# Patient Record
(demographics unavailable — no encounter records)

---

## 2024-10-08 NOTE — PAST MEDICAL HISTORY
[Increasing age ( >40 years old)] : Increasing age ( >40 years old) [Altered mobility] : Altered mobility [Congestive Heart Failure] : Congestive Heart Failure [Varicose Veins] : Varicose Veins [No therapy indicated for cases scheduled for less than one hour] : No therapy indicated for cases scheduled for less than one hour. [FreeTextEntry1] : Malignant Hyperthermia Screening Tool and Risk of Bleeding Assessment  Ms. ADDIE RODRIGUEZ denies family history of unexpected death following Anesthesia or Exercise. Denies Family history of Malignant Hyperthermia, Muscle or Neuromuscular disorder and High Temperature following exercise.  Ms. ADDIE RODRIGUEZ denies history of Muscle Spasm, Dark or Chocolate - Colored urine and Unanticipated fever immediately following anesthesia or serious exercise.  Ms. RODRIGUEZ also denies bleeding tendencies/ Risks of Bleeding.

## 2024-10-08 NOTE — ASSESSMENT
[FreeTextEntry1] : 70-year-old female with bilateral lower extremity venous stasis ulcers, status post right saphenous vein ablation  dea > 1 b/l lower extremities with normal distal waveforms  Left lateral ankle wound continues to improve.  Based on the fact that patient has severe venous stasis changes with ulcers and superficial venous insufficiency, plan for ablation of the left greater and lesser saphenous vein.

## 2024-10-08 NOTE — HISTORY OF PRESENT ILLNESS
[FreeTextEntry1] : accompanied by daughter Lo [phone] and darcie Jordy [phone] feels ok took methadone, BP meds and aspirin this morning  [FreeTextEntry5] : yesterday at  [FreeTextEntry6] : Dr. Manzanares

## 2024-10-21 NOTE — HISTORY OF PRESENT ILLNESS
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  Patient is currently being treated with bilateral lower extremity Unna boots.  Denies rest pain or claudication symptoms.  Denies fever or chills.

## 2024-10-21 NOTE — ASSESSMENT
[FreeTextEntry1] : 70-year-old female with bilateral lower extremity venous stasis ulcers, status post right saphenous vein ablation  dea > 1 b/l lower extremities with normal distal waveforms  Left lateral ankle wound continues to improve. Patient with recent hospitalization and high fevers requiring antibiotics and local wound care.  Will hold off ablation at this time.  Continue local wound care with application of right lower extremity Unna boot.   Follow-up next week for change of Unna boot.

## 2024-10-21 NOTE — PHYSICAL EXAM
[JVD] : no jugular venous distention  [Normal Breath Sounds] : Normal breath sounds [Normal Rate and Rhythm] : normal rate and rhythm [0] : left 0 [2+] : left 2+ [Ankle Swelling (On Exam)] : not present [Varicose Veins Of Lower Extremities] : not present [] : bilaterally [Ankle Swelling On The Left] : moderate [Skin Ulcer] : ulcer [Alert] : alert [Calm] : calm [de-identified] : appears well  [de-identified] : Right medial ankle ulcer - 1.8 x 3 cm

## 2024-10-30 NOTE — PHYSICAL EXAM
[Normal Breath Sounds] : Normal breath sounds [Normal Rate and Rhythm] : normal rate and rhythm [0] : left 0 [2+] : left 2+ [] : bilaterally [Ankle Swelling On The Left] : moderate [Skin Ulcer] : ulcer [Alert] : alert [Calm] : calm [JVD] : no jugular venous distention  [Ankle Swelling (On Exam)] : not present [Varicose Veins Of Lower Extremities] : not present [de-identified] : appears well  [de-identified] : Right medial ankle ulcer - 1.2 x 1.5 cm

## 2024-10-30 NOTE — PHYSICAL EXAM
[Normal Breath Sounds] : Normal breath sounds [Normal Rate and Rhythm] : normal rate and rhythm [0] : left 0 [2+] : left 2+ [] : bilaterally [Ankle Swelling On The Left] : moderate [Skin Ulcer] : ulcer [Alert] : alert [Calm] : calm [JVD] : no jugular venous distention  [Ankle Swelling (On Exam)] : not present [Varicose Veins Of Lower Extremities] : not present [de-identified] : appears well  [de-identified] : Right medial ankle ulcer - 1.2 x 1.5 cm

## 2024-10-30 NOTE — ASSESSMENT
[Arterial/Venous Disease] : arterial/venous disease [Ulcer Care] : ulcer care [FreeTextEntry1] : 70-year-old female with bilateral lower extremity venous stasis ulcers, status post right saphenous vein ablation  dea > 1 b/l lower extremities with normal distal waveforms  Right medial ankle wound continues to improve. Patient with recent hospitalization and high fevers requiring antibiotics and local wound care.  Will hold off ablation at this time.  Continue local wound care with application of bilateral lower extremity Unna boots.   Follow-up next week for change of Unna boot.

## 2024-11-05 NOTE — ASSESSMENT
[FreeTextEntry1] : 70-year-old female with bilateral lower extremity venous stasis ulcers, status post right saphenous vein ablation  dea > 1 b/l lower extremities with normal distal waveforms  Right medial ankle wound continues to improve. Patient with recent hospitalization and high fevers requiring antibiotics and local wound care.  Will hold off ablation at this time.  Continue local wound care with application of bilateral lower extremity Unna boots.   Follow-up next week for change of Unna boot. [Arterial/Venous Disease] : arterial/venous disease [Ulcer Care] : ulcer care

## 2024-11-05 NOTE — PHYSICAL EXAM
[JVD] : no jugular venous distention  [Normal Breath Sounds] : Normal breath sounds [Normal Rate and Rhythm] : normal rate and rhythm [0] : left 0 [2+] : left 2+ [Ankle Swelling (On Exam)] : not present [Varicose Veins Of Lower Extremities] : not present [] : bilaterally [Ankle Swelling On The Left] : moderate [Skin Ulcer] : ulcer [Alert] : alert [Calm] : calm [de-identified] : appears well  [de-identified] : Right medial ankle ulcer - 1.2 x 1.5 cm

## 2024-11-12 NOTE — PHYSICAL EXAM
[Normal Breath Sounds] : Normal breath sounds [Normal Rate and Rhythm] : normal rate and rhythm [0] : left 0 [2+] : left 2+ [Ankle Swelling On The Left] : moderate [] : bilaterally [Skin Ulcer] : ulcer [Alert] : alert [Calm] : calm [JVD] : no jugular venous distention  [Ankle Swelling (On Exam)] : not present [Varicose Veins Of Lower Extremities] : not present [de-identified] : appears well  [de-identified] : Right medial ankle ulcer - 1.2 x 1.5 cm

## 2024-11-18 NOTE — PHYSICAL EXAM
[JVD] : no jugular venous distention  [Normal Breath Sounds] : Normal breath sounds [Normal Rate and Rhythm] : normal rate and rhythm [0] : left 0 [2+] : left 2+ [Ankle Swelling (On Exam)] : not present [Varicose Veins Of Lower Extremities] : not present [] : bilaterally [Ankle Swelling On The Left] : moderate [Skin Ulcer] : ulcer [Alert] : alert [Calm] : calm [de-identified] : appears well  [de-identified] : Right medial ankle ulcer - 1.2 x 1.5 cm

## 2024-11-25 NOTE — PHYSICAL EXAM
[JVD] : no jugular venous distention  [Normal Breath Sounds] : Normal breath sounds [Normal Rate and Rhythm] : normal rate and rhythm [0] : left 0 [2+] : left 2+ [Ankle Swelling (On Exam)] : not present [Varicose Veins Of Lower Extremities] : not present [] : bilaterally [Ankle Swelling On The Left] : moderate [Skin Ulcer] : ulcer [Alert] : alert [Calm] : calm [de-identified] : appears well  [de-identified] : Right medial ankle ulcer - 1.2 x 1.5 cm

## 2024-12-02 NOTE — PHYSICAL EXAM
[JVD] : no jugular venous distention  [Normal Breath Sounds] : Normal breath sounds [Normal Rate and Rhythm] : normal rate and rhythm [0] : left 0 [2+] : left 2+ [Ankle Swelling (On Exam)] : not present [Varicose Veins Of Lower Extremities] : not present [] : bilaterally [Ankle Swelling On The Left] : moderate [Skin Ulcer] : ulcer [Alert] : alert [Calm] : calm [de-identified] : appears well  [de-identified] : Right medial ankle ulcer - 1.2 x 1.5 cm

## 2024-12-09 NOTE — PHYSICAL EXAM
[JVD] : no jugular venous distention  [Normal Breath Sounds] : Normal breath sounds [Normal Rate and Rhythm] : normal rate and rhythm [0] : left 0 [2+] : left 2+ [Ankle Swelling (On Exam)] : not present [Varicose Veins Of Lower Extremities] : not present [] : bilaterally [Ankle Swelling On The Left] : moderate [Skin Ulcer] : ulcer [Alert] : alert [Calm] : calm [de-identified] : appears well  [de-identified] : Right medial ankle ulcer - 1.2 x 1.5 cm

## 2024-12-16 NOTE — PHYSICAL EXAM
[Normal Breath Sounds] : Normal breath sounds [Normal Rate and Rhythm] : normal rate and rhythm [0] : left 0 [2+] : left 2+ [] : bilaterally [Ankle Swelling On The Left] : moderate [Skin Ulcer] : ulcer [Alert] : alert [Calm] : calm [JVD] : no jugular venous distention  [Ankle Swelling (On Exam)] : not present [Varicose Veins Of Lower Extremities] : not present [de-identified] : appears well  [de-identified] : Right medial ankle ulcer - 1.2 x 1.5 cm

## 2024-12-23 NOTE — PHYSICAL EXAM
[JVD] : no jugular venous distention  [Normal Breath Sounds] : Normal breath sounds [Normal Rate and Rhythm] : normal rate and rhythm [0] : left 0 [2+] : left 2+ [Ankle Swelling (On Exam)] : not present [Varicose Veins Of Lower Extremities] : not present [] : bilaterally [Ankle Swelling On The Left] : moderate [Skin Ulcer] : ulcer [Alert] : alert [Calm] : calm [de-identified] : appears well  [de-identified] : Right medial ankle ulcer - 1.2 x 1.5 cm

## 2024-12-30 NOTE — PHYSICAL EXAM
[Normal Breath Sounds] : Normal breath sounds [Normal Rate and Rhythm] : normal rate and rhythm [0] : left 0 [2+] : left 2+ [] : bilaterally [Ankle Swelling On The Left] : moderate [Skin Ulcer] : ulcer [Alert] : alert [Calm] : calm [JVD] : no jugular venous distention  [Ankle Swelling (On Exam)] : not present [de-identified] : appears well  [Varicose Veins Of Lower Extremities] : not present [de-identified] : Right medial ankle ulcer - 1 x 1.4 cm

## 2025-01-06 NOTE — PHYSICAL EXAM
[JVD] : no jugular venous distention  [Normal Breath Sounds] : Normal breath sounds [Normal Rate and Rhythm] : normal rate and rhythm [0] : left 0 [2+] : left 2+ [Ankle Swelling (On Exam)] : not present [Varicose Veins Of Lower Extremities] : not present [] : bilaterally [Ankle Swelling On The Left] : moderate [Skin Ulcer] : ulcer [Alert] : alert [Calm] : calm [de-identified] : appears well  [de-identified] : Right medial ankle ulcer - 1 x 1.4 cm

## 2025-01-13 NOTE — PHYSICAL EXAM
[Normal Breath Sounds] : Normal breath sounds [Normal Rate and Rhythm] : normal rate and rhythm [0] : left 0 [2+] : left 2+ [] : bilaterally [Ankle Swelling On The Left] : moderate [Skin Ulcer] : ulcer [Alert] : alert [Calm] : calm [JVD] : no jugular venous distention  [Ankle Swelling (On Exam)] : not present [Varicose Veins Of Lower Extremities] : not present [de-identified] : appears well  [de-identified] : Right medial ankle ulcer - 1.1 x 0.7cm

## 2025-01-28 NOTE — PHYSICAL EXAM
[Normal Breath Sounds] : Normal breath sounds [Normal Rate and Rhythm] : normal rate and rhythm [0] : left 0 [2+] : left 2+ [] : bilaterally [Ankle Swelling On The Left] : moderate [Skin Ulcer] : ulcer [Alert] : alert [Calm] : calm [JVD] : no jugular venous distention  [Ankle Swelling (On Exam)] : not present [Varicose Veins Of Lower Extremities] : not present [de-identified] : appears well  [de-identified] : Right medial ankle ulcer - 1.1 x 0.5 cm

## 2025-01-28 NOTE — PHYSICAL EXAM
[Normal Breath Sounds] : Normal breath sounds [Normal Rate and Rhythm] : normal rate and rhythm [0] : left 0 [2+] : left 2+ [] : bilaterally [Ankle Swelling On The Left] : moderate [Skin Ulcer] : ulcer [Alert] : alert [Calm] : calm [JVD] : no jugular venous distention  [Ankle Swelling (On Exam)] : not present [Varicose Veins Of Lower Extremities] : not present [de-identified] : appears well  [de-identified] : Right medial ankle ulcer - 1.1 x 0.5 cm

## 2025-02-03 NOTE — PHYSICAL EXAM
[Normal Breath Sounds] : Normal breath sounds [Normal Rate and Rhythm] : normal rate and rhythm [0] : left 0 [2+] : left 2+ [] : bilaterally [Ankle Swelling On The Left] : moderate [Skin Ulcer] : ulcer [Alert] : alert [Calm] : calm

## 2025-02-10 NOTE — ASSESSMENT
[FreeTextEntry1] : 70-year-old female with bilateral lower extremity venous stasis ulcers, status post right saphenous vein ablation  dea > 1 b/l lower extremities with normal distal waveforms  Right medial ankle wound continues to improve.  Continue local wound care with application of bilateral lower extremity Unna boots.   Follow-up next week for change of Unna boot. [Arterial/Venous Disease] : arterial/venous disease [Ulcer Care] : ulcer care

## 2025-02-10 NOTE — PHYSICAL EXAM
[JVD] : no jugular venous distention  [Normal Breath Sounds] : Normal breath sounds [Normal Rate and Rhythm] : normal rate and rhythm [0] : left 0 [2+] : left 2+ [Ankle Swelling (On Exam)] : not present [Varicose Veins Of Lower Extremities] : not present [] : bilaterally [Ankle Swelling On The Left] : moderate [Skin Ulcer] : ulcer [Alert] : alert [Calm] : calm [de-identified] : appears well  [de-identified] : Right medial ankle ulcer - 0.5 x 0.7 cm

## 2025-02-18 NOTE — ASSESSMENT
[Arterial/Venous Disease] : arterial/venous disease [Ulcer Care] : ulcer care [FreeTextEntry1] : 70-year-old female with bilateral lower extremity venous stasis ulcers, status post right saphenous vein ablation  dea > 1 b/l lower extremities with normal distal waveforms  Right medial ankle wound continues to improve.  Continue local wound care with application of bilateral lower extremity Unna boots.   Follow-up next week for change of Unna boot.

## 2025-02-18 NOTE — PHYSICAL EXAM
[Normal Breath Sounds] : Normal breath sounds [Normal Rate and Rhythm] : normal rate and rhythm [0] : left 0 [2+] : left 2+ [] : bilaterally [Ankle Swelling On The Left] : moderate [Skin Ulcer] : ulcer [Alert] : alert [Calm] : calm [JVD] : no jugular venous distention  [Ankle Swelling (On Exam)] : not present [Varicose Veins Of Lower Extremities] : not present [de-identified] : appears well  [de-identified] : Right medial ankle ulcer - 0.4 x 0.5 cm

## 2025-02-24 NOTE — PHYSICAL EXAM
[Normal Breath Sounds] : Normal breath sounds [Normal Rate and Rhythm] : normal rate and rhythm [0] : left 0 [2+] : left 2+ [] : bilaterally [Ankle Swelling On The Left] : moderate [Skin Ulcer] : ulcer [Alert] : alert [Calm] : calm [JVD] : no jugular venous distention  [Ankle Swelling (On Exam)] : not present [Varicose Veins Of Lower Extremities] : not present [de-identified] : appears well  [de-identified] : Right medial ankle ulcer - 0.3 x 0.4 cm

## 2025-03-11 NOTE — PHYSICAL EXAM
[Normal Breath Sounds] : Normal breath sounds [Normal Rate and Rhythm] : normal rate and rhythm [0] : left 0 [2+] : left 2+ [Ankle Swelling On The Right] : of the right ankle [] : bilaterally [Ankle Swelling On The Left] : moderate [Skin Ulcer] : ulcer [Alert] : alert [Calm] : calm [JVD] : no jugular venous distention  [Ankle Swelling (On Exam)] : not present [Varicose Veins Of Lower Extremities] : not present [de-identified] : appears well  [FreeTextEntry1] : Moderate to severe right calf edema [de-identified] : Right medial ankle ulcer - 0.6 x 0.8 cm, Superficial skin loss of right foot

## 2025-03-11 NOTE — HISTORY OF PRESENT ILLNESS
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  Patient is currently being treated with bilateral lower extremity Unna boots. Patient reports recent hospital admission for bacterial infection.  Denies rest pain or claudication symptoms.  Denies fever or chills.

## 2025-03-11 NOTE — ASSESSMENT
[Arterial/Venous Disease] : arterial/venous disease [Ulcer Care] : ulcer care [FreeTextEntry1] : 70-year-old female with bilateral lower extremity venous stasis ulcers, status post right saphenous vein ablation  Recommend Augmentin 875mg x 10 days.  Continue local wound care with application of bilateral lower extremity Unna boots.   Follow-up Thursday for change of Unna boot.

## 2025-03-11 NOTE — PHYSICAL EXAM
[Normal Breath Sounds] : Normal breath sounds [Normal Rate and Rhythm] : normal rate and rhythm [0] : left 0 [2+] : left 2+ [Ankle Swelling On The Right] : of the right ankle [] : bilaterally [Ankle Swelling On The Left] : moderate [Skin Ulcer] : ulcer [Alert] : alert [Calm] : calm [JVD] : no jugular venous distention  [Ankle Swelling (On Exam)] : not present [Varicose Veins Of Lower Extremities] : not present [de-identified] : appears well  [FreeTextEntry1] : Moderate to severe right calf edema [de-identified] : Right medial ankle ulcer - 0.6 x 0.8 cm, Superficial skin loss of right foot

## 2025-03-17 NOTE — ASSESSMENT
[FreeTextEntry1] : 70-year-old female with bilateral lower extremity venous stasis ulcers, status post right saphenous vein ablation  Recommend Augmentin 875mg x 10 days.  Continue local wound care with application of bilateral lower extremity Unna boots.   Follow-up Thursday for change of Unna boot. [Arterial/Venous Disease] : arterial/venous disease [Ulcer Care] : ulcer care

## 2025-03-17 NOTE — PHYSICAL EXAM
[JVD] : no jugular venous distention  [Normal Breath Sounds] : Normal breath sounds [Normal Rate and Rhythm] : normal rate and rhythm [0] : left 0 [2+] : left 2+ [Ankle Swelling (On Exam)] : not present [Ankle Swelling On The Right] : of the right ankle [Varicose Veins Of Lower Extremities] : not present [] : bilaterally [Ankle Swelling On The Left] : moderate [Skin Ulcer] : ulcer [Alert] : alert [Calm] : calm [de-identified] : appears well  [FreeTextEntry1] : Moderate to severe right calf edema [de-identified] : Right medial ankle ulcer - 0.6 x 0.8 cm, Superficial skin loss of right foot

## 2025-03-18 NOTE — PROCEDURE
[FreeTextEntry1] : Left leg endovenous laser ablation  [D/C IV on discharge] : D/C IV on discharge [Resume diet] : resume diet [Dressing checked for bleeding] : Dressing checked for bleeding [Vital signs on admission the q 15 mins x2] : Vital signs on admission the q 15 mins x2

## 2025-03-18 NOTE — ASSESSMENT
[FreeTextEntry1] : 70-year-old female with bilateral lower extremity venous stasis ulcers, status post right saphenous vein ablation  Plan for left leg endovenous laser ablation

## 2025-03-18 NOTE — HISTORY OF PRESENT ILLNESS
[FreeTextEntry1] : accompanied by daughter Lo [phone] and aide Jordy [phone] feels ok took methadone, BP meds and aspirin this morning bilateral unna boots on [FreeTextEntry6] : Dr. Manzanares [FreeTextEntry5] : yesterday at

## 2025-03-24 NOTE — PHYSICAL EXAM
[JVD] : no jugular venous distention  [Normal Breath Sounds] : Normal breath sounds [Normal Rate and Rhythm] : normal rate and rhythm [0] : left 0 [2+] : left 2+ [Ankle Swelling (On Exam)] : not present [Ankle Swelling On The Right] : of the right ankle [Varicose Veins Of Lower Extremities] : not present [] : bilaterally [Ankle Swelling On The Left] : moderate [Skin Ulcer] : ulcer [Alert] : alert [Calm] : calm [de-identified] : appears well  [FreeTextEntry1] : Moderate to severe right calf edema [de-identified] : Right medial ankle ulcer - 0.6 x 0.8 cm, Superficial skin loss of right foot

## 2025-03-24 NOTE — ASSESSMENT
[FreeTextEntry1] : 70-year-old female with bilateral lower extremity venous stasis ulcers, status post right saphenous vein ablation  Continue local wound care with application of bilateral lower extremity Unna boots.  Patient with severe venous insufficiency involving the left lower extremity.  Will schedule the patient for ablation of saphenous vein in the left leg this week.   [Arterial/Venous Disease] : arterial/venous disease [Ulcer Care] : ulcer care

## 2025-03-31 NOTE — ASSESSMENT
[Arterial/Venous Disease] : arterial/venous disease [Ulcer Care] : ulcer care [FreeTextEntry1] : 70-year-old female with bilateral lower extremity venous stasis ulcers, status post right saphenous vein ablation  Continue local wound care with application of bilateral lower extremity Unna boots.  Patient with severe venous insufficiency involving the left lower extremity.  Will schedule the patient for ablation of saphenous vein in the left leg next week.

## 2025-03-31 NOTE — PHYSICAL EXAM
[Normal Breath Sounds] : Normal breath sounds [Normal Rate and Rhythm] : normal rate and rhythm [0] : left 0 [2+] : left 2+ [Ankle Swelling On The Right] : of the right ankle [] : bilaterally [Ankle Swelling On The Left] : moderate [Skin Ulcer] : ulcer [Alert] : alert [Calm] : calm [JVD] : no jugular venous distention  [Ankle Swelling (On Exam)] : not present [Varicose Veins Of Lower Extremities] : not present [de-identified] : appears well  [FreeTextEntry1] : Moderate to severe right calf edema [de-identified] : Right medial ankle ulcer #1 - 0.7 x 0.4 cm, Right medial ankle ulcer #2 - 0.3 x 0.4 cm

## 2025-04-09 NOTE — HISTORY OF PRESENT ILLNESS
[FreeTextEntry1] : accompanied by Tracey [phone] feels ok uses w/c, able to transfer with assistance took methadone this morning bilateral unna boots on [FreeTextEntry5] : yesterday at 1100pm [FreeTextEntry6] : Dr. Manzanares

## 2025-04-09 NOTE — PAST MEDICAL HISTORY
[FreeTextEntry1] : Malignant Hyperthermia Screening Tool and Risk of Bleeding Assessment  Ms. ADDIE RODRIGUEZ denies family history of unexpected death following Anesthesia or Exercise. Denies Family history of Malignant Hyperthermia, Muscle or Neuromuscular disorder and High Temperature following exercise.  Ms. ADDIE RODRIGUEZ denies history of Muscle Spasm, Dark or Chocolate - Colored urine and Unanticipated fever immediately following anesthesia or serious exercise.  Ms. RODRIGUEZ also denies bleeding tendencies/ Risks of Bleeding.
none

## 2025-04-09 NOTE — HISTORY OF PRESENT ILLNESS
[FreeTextEntry1] : accompanied by Tracey [phone] feels ok uses w/c, able to transfer with assistance took methadone this morning bilateral unna boots on [FreeTextEntry5] : yesterday at 1100pm [FreeTextEntry6] : Dr. Manzanares

## 2025-04-09 NOTE — HISTORY OF PRESENT ILLNESS
[FreeTextEntry1] : accompanied by Tracey [phone] feels ok uses w/c, able to transfer with assistance took methadone this morning bilateral unna boots on [FreeTextEntry5] : yesterday at 1100pm [FreeTextEntry6] : Dr. Manzanares

## 2025-04-14 NOTE — ASSESSMENT
[Arterial/Venous Disease] : arterial/venous disease [Ulcer Care] : ulcer care [FreeTextEntry1] : 70-year-old female with bilateral lower extremity venous stasis ulcers, status post right saphenous vein ablation  Patient now status post radiofrequency ablation of left greater saphenous vein.  Duplex demonstrates successful ablation with no evidence of deep vein thrombosis.  Continue local wound care with application of bilateral lower extremity Unna boots.   Follow-up 1 week.

## 2025-04-14 NOTE — HISTORY OF PRESENT ILLNESS
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  Patient is currently being treated with bilateral lower extremity Unna boots.   Patient now status post ablation.  Patient reports pain is well-controlled.  Denies fever or chills.  Denies chest pain or shortness of breath.  Denies rest pain.

## 2025-04-14 NOTE — PHYSICAL EXAM
[Normal Breath Sounds] : Normal breath sounds [Normal Rate and Rhythm] : normal rate and rhythm [0] : left 0 [2+] : left 2+ [Ankle Swelling On The Right] : of the right ankle [] : bilaterally [Ankle Swelling On The Left] : moderate [Skin Ulcer] : ulcer [Alert] : alert [Calm] : calm [JVD] : no jugular venous distention  [Ankle Swelling (On Exam)] : not present [Varicose Veins Of Lower Extremities] : not present [de-identified] : appears well  [FreeTextEntry1] : Moderate to severe right calf edema [de-identified] : No palpable cords.  No calf tenderness. [de-identified] : Right medial ankle ulcer #1 - 0.5 x 0.4 cm

## 2025-04-22 NOTE — PHYSICAL EXAM
[Normal Breath Sounds] : Normal breath sounds [Normal Rate and Rhythm] : normal rate and rhythm [0] : left 0 [2+] : left 2+ [Ankle Swelling On The Right] : of the right ankle [] : bilaterally [Ankle Swelling On The Left] : moderate [Skin Ulcer] : ulcer [Alert] : alert [Calm] : calm [JVD] : no jugular venous distention  [Ankle Swelling (On Exam)] : not present [Varicose Veins Of Lower Extremities] : not present [de-identified] : appears well  [de-identified] : No palpable cords.  No calf tenderness. [de-identified] : Right medial ankle ulcer #1 - 0.5 x 0.4 cm

## 2025-04-29 NOTE — ASSESSMENT
[FreeTextEntry1] : 70-year-old female with bilateral lower extremity venous stasis ulcers, status post mihir saphenous vein ablation  Patient now status post radiofrequency ablation of bilateral greater saphenous vein.  Duplex demonstrates successful ablation with no evidence of deep vein thrombosis.  Continue local wound care with application of bilateral lower extremity Unna boots.   Follow-up 1 week.      [Arterial/Venous Disease] : arterial/venous disease [Ulcer Care] : ulcer care

## 2025-04-29 NOTE — PHYSICAL EXAM
[JVD] : no jugular venous distention  [Normal Breath Sounds] : Normal breath sounds [Normal Rate and Rhythm] : normal rate and rhythm [0] : left 0 [2+] : left 2+ [Ankle Swelling (On Exam)] : not present [Ankle Swelling On The Right] : of the right ankle [Varicose Veins Of Lower Extremities] : not present [] : bilaterally [Ankle Swelling On The Left] : moderate [Skin Ulcer] : ulcer [Alert] : alert [Calm] : calm [de-identified] : appears well  [de-identified] : No palpable cords.  No calf tenderness. [de-identified] : Right medial ankle ulcer #1 - 0.5 x 0.4 cm

## 2025-05-05 NOTE — ASSESSMENT
[Arterial/Venous Disease] : arterial/venous disease [Ulcer Care] : ulcer care [FreeTextEntry1] : 70-year-old female with bilateral lower extremity venous stasis ulcers, status post bilateral saphenous vein ablations  Venous stasis ulcers continue to improve.  Continue treatment with bilateral lower extremity Unna boots.  Patient to follow-up in 1 week.

## 2025-05-05 NOTE — PHYSICAL EXAM
[Normal Breath Sounds] : Normal breath sounds [Normal Rate and Rhythm] : normal rate and rhythm [0] : left 0 [2+] : left 2+ [Ankle Swelling On The Right] : of the right ankle [] : bilaterally [Ankle Swelling On The Left] : moderate [Skin Ulcer] : ulcer [Alert] : alert [Calm] : calm [JVD] : no jugular venous distention  [Ankle Swelling (On Exam)] : not present [Varicose Veins Of Lower Extremities] : not present [de-identified] : appears well  [de-identified] : No palpable cords.  No calf tenderness. [de-identified] : Right medial ankle ulcer #1 - 0.3 x 0.3 cm

## 2025-05-12 NOTE — ASSESSMENT
[Arterial/Venous Disease] : arterial/venous disease [Ulcer Care] : ulcer care [FreeTextEntry1] : 70-year-old female with bilateral lower extremity venous stasis ulcers, status post bilateral saphenous vein ablations  Venous stasis ulcer has now resolved.  Patient to continue treatment with compression stockings, leg elevation, and weight loss.  No further vascular intervention is required at this time.  Patient to follow-up as needed.

## 2025-05-12 NOTE — PHYSICAL EXAM
[Normal Breath Sounds] : Normal breath sounds [Normal Rate and Rhythm] : normal rate and rhythm [0] : left 0 [2+] : left 2+ [Ankle Swelling On The Right] : of the right ankle [] : bilaterally [Ankle Swelling On The Left] : moderate [No Rash or Lesion] : No rash or lesion [Alert] : alert [Calm] : calm [JVD] : no jugular venous distention  [Ankle Swelling (On Exam)] : not present [Varicose Veins Of Lower Extremities] : not present [de-identified] : appears well  [de-identified] : No palpable cords.  No calf tenderness.

## 2025-06-02 NOTE — PHYSICAL EXAM
[JVD] : no jugular venous distention  [Normal Breath Sounds] : Normal breath sounds [Normal Rate and Rhythm] : normal rate and rhythm [0] : left 0 [2+] : left 2+ [Ankle Swelling (On Exam)] : not present [Ankle Swelling On The Right] : of the right ankle [Varicose Veins Of Lower Extremities] : not present [] : bilaterally [Ankle Swelling On The Left] : moderate [Alert] : alert [Calm] : calm [de-identified] : appears well  [de-identified] : No palpable cords.  No calf tenderness. [de-identified] : Right medial calf superficial ulceration - 0.6 x 0.5 cm, Right medial malleolus 1 x 0.5cm superficial ulceration

## 2025-06-02 NOTE — HISTORY OF PRESENT ILLNESS
[FreeTextEntry1] : 71 year old female former smoker with history of diabetes, hypertension, status post left 2nd digit amputation for osteomyelitis, and chronic venous insufficiency with recurrent venous ulcerations who presents the office today for follow-up.    Patient now status post ablation.  Patient reports pain is well-controlled.  Denies fever or chills.  Denies chest pain or shortness of breath.  Denies rest pain.

## 2025-06-02 NOTE — ASSESSMENT
[FreeTextEntry1] : 71-year-old female with recurrent right lower extremity venous stasis ulcers, status post bilateral saphenous vein ablations  No significant arterial insufficiency at this time.  Patient to continue treatment with compression stockings, leg elevation, and weight loss.   Continue local wound care.  Patient to follow-up in 2 weeks.       [Arterial/Venous Disease] : arterial/venous disease [Ulcer Care] : ulcer care